# Patient Record
Sex: FEMALE | URBAN - NONMETROPOLITAN AREA
[De-identification: names, ages, dates, MRNs, and addresses within clinical notes are randomized per-mention and may not be internally consistent; named-entity substitution may affect disease eponyms.]

---

## 2023-08-17 ENCOUNTER — APPOINTMENT (RX ONLY)
Dept: URBAN - NONMETROPOLITAN AREA CLINIC 13 | Facility: CLINIC | Age: 83
Setting detail: DERMATOLOGY
End: 2023-08-17

## 2023-08-17 DIAGNOSIS — Z41.9 ENCOUNTER FOR PROCEDURE FOR PURPOSES OTHER THAN REMEDYING HEALTH STATE, UNSPECIFIED: ICD-10-CM

## 2023-08-17 PROCEDURE — ? COSMETIC CONSULTATION: FRACTIONAL RESURFACING

## 2023-08-17 PROCEDURE — ? FRAXEL

## 2023-08-17 PROCEDURE — ? COSMETIC CONSULTATION: Q SWITCHED LASER

## 2023-08-17 ASSESSMENT — LOCATION ZONE DERM: LOCATION ZONE: HAND

## 2023-08-17 ASSESSMENT — LOCATION DETAILED DESCRIPTION DERM: LOCATION DETAILED: RIGHT RADIAL DORSAL HAND

## 2023-08-17 ASSESSMENT — LOCATION SIMPLE DESCRIPTION DERM: LOCATION SIMPLE: RIGHT HAND

## 2023-08-17 NOTE — PROCEDURE: FRAXEL
Location: Use Location Override
Tip: 15mm
Topical Anesthesia Type: 23% lidocaine, 7% tetracaine
Was An Eye Shield Used?: No
Treatment Level: 3
Number Of Passes: 1
Medium Plastic Eye Shield Text: The ocular mucosa was anesthetized with tetracaine. Once adequate anesthesia was optained, medium plastic eye shields were inserted and remained in place until the procedure was completed.
Number Of Passes: 4
Wavelength: 1927nm
Energy(Mj/Cm2): 10
Anesthesia Type: 1% lidocaine with epinephrine
Small Metal Eye Shield Text: The ocular mucosa was anesthetized with tetracaine. Once adequate anesthesia was optained, small metal eye shields were inserted and remained in place until the procedure was completed.
Detail Level: Zone
Location: neck
Length Of Topical Anesthesia Application (Optional): 15 minutes
Large Plastic Eye Shield Text: The ocular mucosa was anesthetized with tetracaine. Once adequate anesthesia was optained, large plastic eye shields were inserted and remained in place until the procedure was completed.
Medium Metal Eye Shield Text: The ocular mucosa was anesthetized with tetracaine. Once adequate anesthesia was optained, medium metal eye shields were inserted and remained in place until the procedure was completed.
Consent: Electronic consent obtained, risks reviewed including but not limited to pain and incomplete improvement.
Indication: photodamage
Add Post-Care Below To The Note: Yes
Large Metal Eye Shield Text: The ocular mucosa was anesthetized with tetracaine. Once adequate anesthesia was optained, large metal eye shields were inserted and remained in place until the procedure was completed.
Post-Care Instructions: I reviewed with the patient in detail post-care instructions. Patient should avoid sun until area fully healed.\\n\\nAlastin skin nectar applied post treatment.  Triamcinolone 1% applied post treatment per verbal order by Tory Gao MD.\\n\\nPer verbal order by Tory Gao MD, if patient tolerated treatment well, may increase to 20j and treatment level 3 at next visit if needed.
Number Of Passes: 8
External Cooling Fan Speed: 5
Small Plastic Eye Shield Text: The ocular mucosa was anesthetized with tetracaine. Once adequate anesthesia was optained, small plastic eye shields were inserted and remained in place until the procedure was completed.
Location Override: Right base of thumb (Test spot)